# Patient Record
Sex: FEMALE | Race: WHITE | Employment: FULL TIME | URBAN - METROPOLITAN AREA
[De-identification: names, ages, dates, MRNs, and addresses within clinical notes are randomized per-mention and may not be internally consistent; named-entity substitution may affect disease eponyms.]

---

## 2023-02-27 DIAGNOSIS — M25.572 LEFT ANKLE PAIN, UNSPECIFIED CHRONICITY: Primary | ICD-10-CM

## 2023-02-28 ENCOUNTER — OFFICE VISIT (OUTPATIENT)
Dept: ORTHOPEDIC SURGERY | Age: 23
End: 2023-02-28

## 2023-02-28 VITALS — RESPIRATION RATE: 16 BRPM | OXYGEN SATURATION: 100 % | HEIGHT: 65 IN | WEIGHT: 158 LBS | BODY MASS INDEX: 26.33 KG/M2

## 2023-02-28 DIAGNOSIS — M62.81 MUSCLE WEAKNESS AFFECTING MOVEMENT OF FOOT: ICD-10-CM

## 2023-02-28 DIAGNOSIS — S86.012A RUPTURE OF LEFT ACHILLES TENDON, INITIAL ENCOUNTER: Primary | ICD-10-CM

## 2023-02-28 DIAGNOSIS — M25.572 LEFT ANKLE PAIN, UNSPECIFIED CHRONICITY: Primary | ICD-10-CM

## 2023-02-28 PROCEDURE — 99204 OFFICE O/P NEW MOD 45 MIN: CPT | Performed by: ORTHOPAEDIC SURGERY

## 2023-02-28 RX ORDER — ASPIRIN 325 MG
325 TABLET, DELAYED RELEASE (ENTERIC COATED) ORAL DAILY
Qty: 42 TABLET | Refills: 0 | Status: SHIPPED | OUTPATIENT
Start: 2023-02-28 | End: 2023-04-11

## 2023-02-28 NOTE — PROGRESS NOTES
[facility] PHYSICIANS [facility] ORTHOPEDICS AND SPORTS MEDICINE  [address]  [place] [address]  Dept: [phone]    Ambulatory Orthopedic Consult      CHIEF COMPLAINT:    Chief Complaint   Patient presents with    Ankle Pain     Left     Foot Pain     Left        HISTORY OF PRESENT ILLNESS:      The patient is a 22 y.o. female who is being seen for evaluation of the above, which began around 2/23/2023, atraumatically  . At today's visit, she is using a lace up ankle brace.     History is obtained today from:   [x]  the patient     [x]  EMR     []  one family member/friend    []  multiple family members/friends    []  other:      The patient is referred here today by Sena Martin of , and she has also seen an orthopedic surgeon in [place] about 2.5 years ago.  Concerning her onset of pain about 5 days ago, she denies feeling a pop, denies any specific injury, but reports that she had an insidious onset of increasing pain at the posterior aspect of the distal leg, with an associated sense of weakness.  She denies any other weakness in any other extremity and denies any other symptoms.    REVIEW OF SYSTEMS:  Musculoskeletal: See HPI for pertinent positives     Past Medical History:    She  has no past medical history on file.     Past Surgical History:    She  has a past surgical history that includes Tonsillectomy.     Current Medications:   No current outpatient medications on file.     Allergies:    Patient has no known allergies.    Family History:  family history includes Diabetes in her father.    Social History:   Social History     Occupational History    Not on file   Tobacco Use    Smoking status: Never    Smokeless tobacco: Not on file   Substance and Sexual Activity    Alcohol use: No    Drug use: No    Sexual activity: Not on file     Works full-time in     OBJECTIVE:  Resp 16   Ht 5' 5\" (1.651 m)   Wt 158 lb (71.7 kg)   SpO2 100%   BMI 26.29 kg/m²    Psych: awake, alert  Cardio:  well perfused extremities, no cyanosis  Resp:  normal respiratory effort  Musculoskeletal:    Affected lower extremity:    Vascular: Limb well perfused, compartments soft/compressible. Skin: No erythema/ulcers. Intact. Neurovascular Status:  Grossly neurovascularly intact throughout  Motion:  Grossly able to fire major muscle groups with appropriate/expected AROM, except for below  Tenderness to Palpation: Posterior Achilles tendon  -Achilles:    -Normal resting tone of affected side compared to contralateral  -Jazlyn Hacker test shows no loss of Achilles continuity  -Able to plantarflex through Achilles tendon, but significantly decreased at first, and with coaching she is eventually able to plantarflex with 4+/5 strength  -No palpable defect in the Achilles tendon, no nodules/mass palpated      RADIOLOGY:   2/28/2023 FINDINGS:  Three weightbearing views (AP, Mortise, and Lateral) of the left ankle and three weightbearing views (AP, Oblique, Lateral) of the left foot were obtained in the office today and reviewed, revealing no acute fracture, dislocation, or radioopaque foreign body/tumor. The ankle mortise is maintained with no widening of the clear spaces. Overall alignment is satisfactory. IMPRESSION:  No acute fracture/dislocation. Electronically signed by Kodak Milner MD     Relevant previous imaging reviewed, both imaging and report(s) as below:    No results found. ASSESSMENT AND PLAN:  Body mass index is 26.29 kg/m². She has left posterior distal leg pain with significant weakness, possibly secondary to an Achilles tendon injury concerning for possible tear, sustained around 2/23/2023.  -The differential diagnosis I am considering includes the following: Nerve lesion/disorder, muscle spasm. Notably, she has the past medical history as above.      We had a discussion today about the likely diagnosis and its natural history, physical exam and imaging findings, as well as various treatment options in detail. Surgically, we discussed that no surgical invention is indicated at this time, but I did recommend first obtaining a more definitive diagnosis with MRI as below. Orders/referrals were placed as below at today's visit. The patient was placed into an Ace wrap and a CAM boot, with 2 heel lifts in place. We discussed that the patient may weight-bear as tolerated, and may remove the boot at night to sleep. We also had a discussion about the risk of blood clot and thromboembolic events. The patient understands that there is an increased risk with surgery/immobilization, and understands nothing will completely eliminate the risk of DVT/PE's, and that any prophylactic medication does not substitute for early mobilization. Given her risk profile, I have recommended the following strategy to decrease the risk of blood clots, and the patient agrees and wishes to proceed:  Aspirin 325 mg PO q Day. In order to know exactly how to proceed with treatment, an MRI was ordered today to evaluate the Achilles tendon. This is medically necessary to evaluate the structures in this area, for both diagnosis and treatment. All questions were answered and the above plan was agreed upon. The patient will return to clinic after the MRI has been obtained. At her next visit, we will review her MRI, and possibly consider an EMG/NCS.             At the patient's next visit, depending on how the patient is doing and/or new imaging/labs results, we may consider the following options:    []  Lace up ankle     []  CAM boot         []  removable wrist brace     []  PT:        []  Wean out immobilization         []  Adv activity      []  Footmind        []  Spenco       []  Custom Orthotic:               []  AZ brace                    []  Rocker Bottom      []  Night splint    []  Heel cups        []  Strap        []  Toe gizmos    []  Topl []  NSAIDs         []  Aishwarya        []  Ref:         []  Stress Xray    []  CT        []  MRI  []  Inj:          []  Consider OR      []  Pick OR date    No follow-ups on file. No orders of the defined types were placed in this encounter. No orders of the defined types were placed in this encounter. Roxana He MD  Orthopedic Surgery        Please excuse any typos/errors, as this note was created with the assistance of voice recognition software. While intending to generate a document that actually reflects the content of the visit, the document can still have some errors including those of syntax and sound-a-like substitutions which may escape proof reading. In such instances, actual meaning can be extrapolated by context.

## 2023-03-10 ENCOUNTER — HOSPITAL ENCOUNTER (OUTPATIENT)
Dept: MRI IMAGING | Age: 23
Discharge: HOME | End: 2023-03-10
Payer: COMMERCIAL

## 2023-03-10 DIAGNOSIS — M62.81 MUSCLE WEAKNESS AFFECTING MOVEMENT OF FOOT: ICD-10-CM

## 2023-03-10 DIAGNOSIS — S86.012A RUPTURE OF LEFT ACHILLES TENDON, INITIAL ENCOUNTER: ICD-10-CM

## 2023-03-10 PROCEDURE — 73721 MRI JNT OF LWR EXTRE W/O DYE: CPT

## 2023-03-14 ENCOUNTER — OFFICE VISIT (OUTPATIENT)
Dept: ORTHOPEDIC SURGERY | Age: 23
End: 2023-03-14
Payer: COMMERCIAL

## 2023-03-14 VITALS — OXYGEN SATURATION: 100 % | BODY MASS INDEX: 26.33 KG/M2 | HEIGHT: 65 IN | RESPIRATION RATE: 16 BRPM | WEIGHT: 158 LBS

## 2023-03-14 DIAGNOSIS — M62.81 MUSCLE WEAKNESS AFFECTING MOVEMENT OF FOOT: ICD-10-CM

## 2023-03-14 DIAGNOSIS — S93.409D SPRAIN OF LIGAMENT OF ANKLE, SUBSEQUENT ENCOUNTER: Primary | ICD-10-CM

## 2023-03-14 DIAGNOSIS — M25.572 LEFT ANKLE PAIN, UNSPECIFIED CHRONICITY: ICD-10-CM

## 2023-03-14 PROCEDURE — 99214 OFFICE O/P EST MOD 30 MIN: CPT | Performed by: ORTHOPAEDIC SURGERY

## 2023-03-14 RX ORDER — NAPROXEN 500 MG/1
500 TABLET ORAL 2 TIMES DAILY PRN
Qty: 60 TABLET | Refills: 0 | Status: SHIPPED | OUTPATIENT
Start: 2023-03-14

## 2023-03-14 NOTE — PROGRESS NOTES
[address] [place] [address] AND SPORTS MEDICINE  [facility] Portillo  [address] [place] [address]  Dept: [phone]    Ambulatory Orthopedic Consult      CHIEF COMPLAINT:    Chief Complaint   Patient presents with    Ankle Pain     Left        HISTORY OF PRESENT ILLNESS:      The patient is a 23 y.o. female who is being seen for evaluation of the above, which began around 2/23/2023, atraumatically  . At today's visit, she is using a lace up ankle brace. History is obtained today from:   [x]  the patient     [x]  EMR     []  one family member/friend    []  multiple family members/friends    []  other:      The patient is referred here today by Jenise Ramos , and she has also seen an orthopedic surgeon in [place] about 2.5 years ago. Concerning her onset of pain about 5 days ago, she denies feeling a pop, denies any specific injury, but reports that she had an insidious onset of increasing pain at the posterior aspect of the distal leg, with an associated sense of weakness. She denies any other weakness in any other extremity and denies any other symptoms. INTERVAL HISTORY 3/14/2023:  She is seen again today in the office for follow up of imaging as below. Since being seen last, the patient is doing better in terms of pain and motion. At today's visit, she is using a brace/boot. History is obtained today from:   [x]  the patient     [x]  EMR     []  one family member/friend    []  multiple family members/friends    []  other: At today's visit, she localizes her pain to the anterolateral ankle, and lateral ankle/hindfoot diffusely. She reports that her posterior Achilles pain has improved. REVIEW OF SYSTEMS:  Musculoskeletal: See HPI for pertinent positives     Past Medical History:    She  has no past medical history on file. Past Surgical History:    She  has a past surgical history that includes Tonsillectomy.      Current Medications:     Current Outpatient Medications:     aspirin 325 MG EC tablet, Take 1 tablet by mouth daily, Disp: 42 tablet, Rfl: 0     Allergies:    Patient has no known allergies. Family History:  family history includes Diabetes in her father. Social History:   Social History     Occupational History    Not on file   Tobacco Use    Smoking status: Never    Smokeless tobacco: Not on file   Substance and Sexual Activity    Alcohol use: No    Drug use: No    Sexual activity: Not on file     Works full-time in [address]:  Resp 16   Ht 5' 5\" (1.651 m)   Wt 158 lb (71.7 kg)   SpO2 100%   BMI 26.29 kg/m²    Psych: awake, alert  Cardio:  well perfused extremities, no cyanosis  Resp:  normal respiratory effort  Musculoskeletal:    Affected lower extremity:    Vascular: Limb well perfused, compartments soft/compressible. Skin: No erythema/ulcers. Intact. Neurovascular Status:  Grossly neurovascularly intact throughout  Motion:  Grossly able to fire major muscle groups with appropriate/expected AROM, except for below  Tenderness to Palpation: Anterolateral ankle/lateral ankle/hindfoot diffusely, posterior Achilles tendon pain--improved  -Achilles:    -Normal resting tone of affected side compared to contralateral  -Tripathi test shows no loss of Achilles continuity  -Able to plantarflex through Achilles tendon, but significantly decreased at first, and with coaching she is eventually able to plantarflex with 4+/5 strength (on initial exam)--now significantly improved strength  -No palpable defect in the Achilles tendon, no nodules/mass palpated      -Instability:  Talar tilt: Negative, but painful; Anterior drawer: Limited exam secondary to pain  -No peroneal subluxation/dislocation with dorsiflexion + eversion or with circumduction      RADIOLOGY:   3/14/2023 Prior images reviewed for reference. MRI images and radiology report reviewed, as below:    1. Mild tendinosis of the intact Achilles tendon.   No retrocalcaneal bursitis. 2. Remote sprain of the intact anterior talofibular ligament. 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 3. No acute osseous findings. 4. Nonspecific subcutaneous edema. FINDINGS:  Three weightbearing views (AP, Mortise, and Lateral) of the left ankle and three weightbearing views (AP, Oblique, Lateral) of the left foot were obtained in the office today and reviewed, revealing no acute fracture, dislocation, or radioopaque foreign body/tumor. The ankle mortise is maintained with no widening of the clear spaces. Overall alignment is satisfactory. IMPRESSION:  No acute fracture/dislocation. Electronically signed by Nunu Soto MD     Relevant previous imaging reviewed, both imaging and report(s) as below:    No results found. ASSESSMENT AND PLAN:  Body mass index is 26.29 kg/m². She has left posterior distal leg pain with significant weakness, possibly secondary to an Achilles tendon injury concerning for possible tear, sustained around 2/23/2023. A left ankle MRI on 3/10/2023 showed an intact Achilles tendon with mild tendinosis, and remote sprain of an intact ATFL. -The differential diagnosis I am considering includes the following: Nerve lesion/disorder, muscle spasm/muscle shutdown secondary to injury rheumatological disorder, ankle sprain, tendinitis  -At today's visit, she does report pain now in her anterolateral ankle/lateral ankle/hindfoot, and does have improved strength and active range of motion. At today's visit, her symptoms seem more consistent with an ankle sprain. Notably, she has the past medical history as above. We had a discussion today about the likely diagnosis and its natural history, physical exam and imaging findings, as well as various treatment options in detail.     Surgically, we discussed that no surgical invention is indicated at this time, and I have recommended conservative management. Orders/referrals were placed as below at today's visit. The patient will stop using her cam boot, we discussed that she will discontinue her aspirin. The patient was provided a lace up brace, for stability and pain control, and to help decrease the risk of reinjury. The patient may weight bear as tolerated, and was cautioned to avoid pain provoking activity. The patient was referred to physical therapy. At today's visit, she was ordered oral NSAIDs as below to be used as needed, and we discussed the appropriate risks. The patient was provided teaching material on this today, and will avoid using multiple NSAIDs at the same time. All questions were answered and the above plan was agreed upon. The patient will return to clinic in 6 weeks without x-rays. At her next visit, depending how she is doing, we may consider a left ankle diagnostic/therapeutic injection and/or EMG/NCS. At the patient's next visit, depending on how the patient is doing and/or new imaging/labs results, we may consider the following options:    []  Lace up ankle     []  CAM boot         []  removable wrist brace     []  PT:        []  Wean out immobilization         []  Adv activity      []  Footmind        []  Spenco       []  Custom Orthotic:               []  AZ brace                    []  Rocker Bottom      []  Night splint    []  Heel cups        []  Strap        []  Toe gizmos    []  Topl        []  NSAIDs         []  Aishwarya        []  Ref:         []  Stress Xray    []  CT        []  MRI  []  Inj:          []  Consider OR      []  Pick OR date    No follow-ups on file. No orders of the defined types were placed in this encounter. No orders of the defined types were placed in this encounter. Sangita Garcia MD  Orthopedic Surgery        Please excuse any typos/errors, as this note was created with the assistance of voice recognition software.  While intending to generate a document that actually reflects the content of the visit, the document can still have some errors including those of syntax and sound-a-like substitutions which may escape proof reading. In such instances, actual meaning can be extrapolated by context.

## 2023-03-20 ENCOUNTER — HOSPITAL ENCOUNTER (OUTPATIENT)
Dept: PHYSICAL THERAPY | Facility: CLINIC | Age: 23
Discharge: HOME | End: 2023-03-20

## 2023-03-20 PROCEDURE — 97016 VASOPNEUMATIC DEVICE THERAPY: CPT

## 2023-03-20 PROCEDURE — 97161 PT EVAL LOW COMPLEX 20 MIN: CPT

## 2023-03-20 PROCEDURE — 97110 THERAPEUTIC EXERCISES: CPT

## 2023-03-20 NOTE — CONSULTS
EPIC         Tests: [] X-Ray: [x] MRI:   Impression   1. Mild tendinosis of the intact Achilles tendon. No retrocalcaneal bursitis. 2. Remote sprain of the intact anterior talofibular ligament. 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 3. No acute osseous findings. 4. Nonspecific subcutaneous edema. [] Other:    Medications: [x] Refer to full medical record [] None [] Other:  Allergies:      [x] Refer to full medical record [] None [] Other:        Marital Status    Home type    Stairs from outside            Hand rail    Stairs inside            [address] [place] [address]-     Job status    Work Activities/duties     Recreational Activities Biking, walking, hiking        Pain present? Yes   Location L ankle globally   Pain Rating currently 4/10   Pain at worse 9/10   Pain at best 2/10   Description of pain Uncomfortable, ache    Altered Sensation Tingling at times in dorsum of foot    What makes it worse Unsure    What makes it better Unsure, ice at night    Symptom progression Not changing    Sleep Difficult to get comfortable. Objective:    ROM  ° A/P STRENGTH    Left Right Left Right   Ext       Ankle DF knee ext -5 10 3-/5 5/5   Ankle DF knee flex  0 25     PF 40 55 3-/5 4+/5   INV 35 35 3/5 4+/5   EVER 10 12 3-/5 4+/5              OBSERVATION No Deficit Deficit Not Tested Comments   Posture       Forward Head [] [] []    Rounded Shoulders [] [] []    Observation        Pes planus bilaterally. Pt does have Dr. Tino Lazar arch support insert in R shoe.  May benefit from OTC arch support once ankle pain has decreased    Palpation [] [] []    Sensation [] [] []    Edema [] [] []    Neurological [] [] []    Patellar Mobility [] [] []    Patellar Orientation [] [] []    Gait [] [x] [] Analysis: Foot flat on push off, unable to perform active PF during gait         FUNCTION Normal Difficult Unable   Sitting [x] [] []   Standing [] [x] []   Ambulation [] [x] []

## 2023-03-29 ENCOUNTER — HOSPITAL ENCOUNTER (OUTPATIENT)
Dept: PHYSICAL THERAPY | Facility: CLINIC | Age: 23
Setting detail: THERAPIES SERIES
Discharge: HOME | End: 2023-03-29

## 2023-03-29 PROCEDURE — 9900000067 HC THERAPEUTIC EXERCISE EA 15 MINS (SELF-PAY)

## 2023-03-29 NOTE — FLOWSHEET NOTE
[] [address] - . [facility] &  Therapy  [address]  [phone]  F: [phone] [] [address]  [address]   [address]  P: [phone]  F: [phone] [] [address]  [address]  P: [phone]  F: [phone] [x] [address]  P: [phone]  F: [phone] [] [address]  [facility]   [address]   [place]: [phone]  F: [phone]      Physical Therapy Daily Treatment Note    Date:  3/29/2023  Patient Name:  Rhiannon Livingston    :  2000  MRN: [id number]  Physician: Dr. Viera Pagoda: Piter Johnson Diagnosis: L ankle sprain               Rehab Codes: M25. 572 (L ankle pain)  Onset date: 23               Next Dr's appt.: TBA pending PT  Visit# / total visits: 2/12   Cancels/No Shows: 0/0    Subjective:    Pain:  [] Yes  [] No Location: L ankle  Pain Rating: (0-10 scale) 2/10  Pain altered Tx:  [] No  [] Yes  Action:  Comments: Pt arrives with signifncaly improved pain. Staets to being able to walk without pain. Only slight soreness today d/t walking a lot at work today. Is still wearing brace when outside of the home, is able to ambulate without brace at home.      Objective:  Modalities:   Precautions:  Exercises:  Exercise Reps/ Time Weight/ Level Comments         Ankle tband 4 way 20x orange Able to perform without pain   SLS  3x30\"                                                                                                           Other: Manual: Milking massage to LLE      Treatment Charges: Mins Units   []  Modalities     [x]  Ther Exercise 20 1   [x]  Manual Therapy 5 0   []  Ther Activities     []  Aquatics     []  Vasocompression     []  Other     Total

## 2023-04-12 PROBLEM — M19.079 ANKLE INFLAMMATION: Status: ACTIVE | Noted: 2023-02-16

## 2023-04-12 PROBLEM — N94.6 DYSMENORRHEA: Status: ACTIVE | Noted: 2023-04-12

## 2023-04-21 ENCOUNTER — OFFICE VISIT (OUTPATIENT)
Dept: FAMILY MEDICINE CLINIC | Age: 23
End: 2023-04-21
Payer: COMMERCIAL

## 2023-04-21 VITALS
SYSTOLIC BLOOD PRESSURE: 113 MMHG | HEIGHT: 66 IN | BODY MASS INDEX: 26.45 KG/M2 | OXYGEN SATURATION: 99 % | DIASTOLIC BLOOD PRESSURE: 72 MMHG | WEIGHT: 164.6 LBS | HEART RATE: 72 BPM

## 2023-04-21 DIAGNOSIS — R22.1 MASS OF LEFT SIDE OF NECK: Primary | ICD-10-CM

## 2023-04-21 DIAGNOSIS — L98.9 FACIAL LESION: ICD-10-CM

## 2023-04-21 PROCEDURE — 99213 OFFICE O/P EST LOW 20 MIN: CPT | Performed by: NURSE PRACTITIONER

## 2023-04-21 ASSESSMENT — PATIENT HEALTH QUESTIONNAIRE - PHQ9
2. FEELING DOWN, DEPRESSED OR HOPELESS: 0
SUM OF ALL RESPONSES TO PHQ QUESTIONS 1-9: 0
SUM OF ALL RESPONSES TO PHQ QUESTIONS 1-9: 0
1. LITTLE INTEREST OR PLEASURE IN DOING THINGS: 0
DEPRESSION UNABLE TO ASSESS: FUNCTIONAL CAPACITY MOTIVATION LIMITS ACCURACY
SUM OF ALL RESPONSES TO PHQ9 QUESTIONS 1 & 2: 0
SUM OF ALL RESPONSES TO PHQ QUESTIONS 1-9: 0
SUM OF ALL RESPONSES TO PHQ QUESTIONS 1-9: 0

## 2023-04-21 ASSESSMENT — ENCOUNTER SYMPTOMS
ABDOMINAL PAIN: 0
CONSTIPATION: 0
SINUS PRESSURE: 0
DIARRHEA: 0
CHEST TIGHTNESS: 0
SHORTNESS OF BREATH: 0
COLOR CHANGE: 0

## 2023-06-08 ENCOUNTER — PROCEDURE VISIT (OUTPATIENT)
Dept: OBGYN CLINIC | Age: 23
End: 2023-06-08

## 2023-06-08 VITALS
HEART RATE: 98 BPM | WEIGHT: 164 LBS | DIASTOLIC BLOOD PRESSURE: 73 MMHG | SYSTOLIC BLOOD PRESSURE: 108 MMHG | BODY MASS INDEX: 26.36 KG/M2 | HEIGHT: 66 IN

## 2023-06-08 DIAGNOSIS — N94.6 DYSMENORRHEA: ICD-10-CM

## 2023-06-08 DIAGNOSIS — N92.6 ABNORMAL MENSTRUAL PERIODS: ICD-10-CM

## 2023-06-08 DIAGNOSIS — Z30.430 ENCOUNTER FOR IUD INSERTION: Primary | ICD-10-CM

## 2023-06-08 DIAGNOSIS — Z23 NEED FOR HPV VACCINATION: ICD-10-CM

## 2023-06-08 DIAGNOSIS — N86 ECTROPION, CERVIX: ICD-10-CM

## 2023-06-08 LAB
CONTROL: PRESENT
PREGNANCY TEST URINE, POC: NEGATIVE

## 2023-06-08 NOTE — PROGRESS NOTES
After obtaining consent, and per orders of Dr. Rosy Galarza, injection of Gardasil given in Left deltoid by Dionne Herr. Patient instructed to remain in clinic for 20 minutes afterwards, and to report any adverse reaction to me immediately.

## 2023-06-08 NOTE — PROGRESS NOTES
[address] [facility]  [facility] Callie Gomez  [address]  [address]  Dept: [phone]      OB/GYN   Procedure Note    Guerra Ella  6/8/2023                       Primary Care Physician: CHOCO Rosado CNP      Subjective:   Guerra Ella 20 y.o. female Dayron Sun is here for previously scheduled Mirena  IUD due to history of dysmenorrhea. Patient's last menstrual period was 05/31/2023. Romeo Patella She is currently taking OCP and denies any recent unprotected intercourse. Urine pregnancy test negative today. She has no complaints today. She is known to have painful menses that interfere with her ADL's. She has tried NSAIDS in the past with little success. She wishes to continue to utilize barrier contraception for family planning and STD precautions. The side effect profile of the IUD was reviewed. All other forms of family planning and options for treatment of her dysmenorrhea were reviewed with the patient. The patient denies use of tobacco products. The patient denies any family member or herself as having a blood clot in their leg, lung, brain or that any member of her family has had a sudden cardiac death under the age of 37 yo. Vitals:   Vitals:    06/08/23 1512   BP: 108/73   Site: Right Upper Arm   Position: Sitting   Cuff Size: Large Adult   Pulse: 98   Weight: 164 lb (74.4 kg)   Height: 5' 6\" (1.676 m)        Cultures:  Vaginal cultures were negative on 4/12/23    Procedure: Insertion of Mirena  IUD    Indications: Dysmenorrhea      Mirena IUD Insertion   The patient was counseled on the procedure. Risks, benefits and alternatives were reviewed. The patient is aware that this is diagnostic and not curative and a second procedure may be needed. A consent was reviewed and obtained. Chaperone was present for insertion. A sterile speculum was placed without incident. The cervix was then cleansed with betadine.  An [employer]

## 2023-07-20 ENCOUNTER — PROCEDURE VISIT (OUTPATIENT)
Dept: OBGYN CLINIC | Age: 23
End: 2023-07-20

## 2023-07-20 VITALS
HEART RATE: 75 BPM | SYSTOLIC BLOOD PRESSURE: 110 MMHG | DIASTOLIC BLOOD PRESSURE: 71 MMHG | WEIGHT: 172 LBS | HEIGHT: 66 IN | BODY MASS INDEX: 27.64 KG/M2

## 2023-07-20 DIAGNOSIS — Z30.431 IUD CHECK UP: Primary | ICD-10-CM

## 2023-07-20 NOTE — PROGRESS NOTES
Chaperone for Intimate Exam  Chaperone was offered as part of the rooming process. Patient declined and agrees to continue with exam without a chaperone.
bruising  Immunologic/Lymphatic: negative recent illness, negative recent sick contact  Musculoskeletal: negative back pain, negative myalgias   Neurological:  negative dizziness, negative migraines, negative seizures   Behavior/Psych: negative depression, negative anxiety     VITALS:  Vitals:    07/20/23 0949   BP: 110/71   Site: Right Upper Arm   Position: Sitting   Cuff Size: Medium Adult   Pulse: 75   Weight: 172 lb (78 kg)   Height: 5' 6\" (1.676 m)       PHYSICAL EXAM:  General appearance: no apparent distress, alert and cooperative  HEENT: head atraumatic, normocephalic, trachea midline, moist mucous membranes   Neurologic: alert, oriented, normal speech, no focal findings or movement disorder noted  Lungs: no increased work of breathing   Abdomen: soft, non-tender on palpation,  no guarding, no rebound, no rigidity   Extremities:  no calf tenderness bilaterally, non-edematous bilaterally   Musculoskeletal: no gross abnormalities, range of motion appropriate for age   Psychiatric: mood appropriate, normal affect   Pelvic Exam:chaperone declined   Vulva: normal appearing vulva, no masses, tenderness or lesions, normal clitoris    Vagina: normal appearing urethral meatus, normal appearing vaginal mucosa with normal color and discharge, no lesions, no vaginal bleeding     Cervix: ectropion cervix, IUD strings seen at os, some yellow tinged discharge present on strings that was easily wiped away with swab, no CMT     Assessment/Plan:  Mirena IUD String Check    - VSS   - Strings seen at os    - Patient has no complaints since IUD was placed    - Discussed menstrual cup use    - Return to the office April 2024 for well women examination      Diagnosis Orders   1.  IUD check up          Patient Active Problem List    Diagnosis Date Noted    Ectropion, cervix 06/08/2023    Dysmenorrhea 04/12/2023     Mirena IUD inserted 6/8/23        Ankle inflammation 02/16/2023         Counseling:  Repeat Annual every 1

## 2023-10-11 ENCOUNTER — OFFICE VISIT (OUTPATIENT)
Dept: OBGYN CLINIC | Age: 23
End: 2023-10-11
Payer: COMMERCIAL

## 2023-10-11 DIAGNOSIS — Z23 NEED FOR HPV VACCINATION: Primary | ICD-10-CM

## 2023-10-11 PROCEDURE — 90471 IMMUNIZATION ADMIN: CPT | Performed by: OBSTETRICS & GYNECOLOGY

## 2023-10-11 PROCEDURE — 99999 PR OFFICE/OUTPT VISIT,PROCEDURE ONLY: CPT | Performed by: OBSTETRICS & GYNECOLOGY

## 2023-10-11 PROCEDURE — 90651 9VHPV VACCINE 2/3 DOSE IM: CPT | Performed by: OBSTETRICS & GYNECOLOGY

## 2023-10-11 NOTE — PROGRESS NOTES
After obtaining verbal consent, and per orders of Dr. Joe Rosado, injection of HPV 0.5mL given in Left deltoid IM by Niall Patten. Patient instructed to remain in clinic for 20 minutes afterwards, and to report any adverse reaction to me immediately.

## 2024-05-09 ENCOUNTER — HOSPITAL ENCOUNTER (OUTPATIENT)
Age: 24
Setting detail: SPECIMEN
Discharge: HOME | End: 2024-05-09

## 2024-05-09 ENCOUNTER — OFFICE VISIT (OUTPATIENT)
Dept: OBGYN CLINIC | Age: 24
End: 2024-05-09
Payer: COMMERCIAL

## 2024-05-09 VITALS
WEIGHT: 163.8 LBS | BODY MASS INDEX: 26.44 KG/M2 | HEART RATE: 79 BPM | DIASTOLIC BLOOD PRESSURE: 80 MMHG | SYSTOLIC BLOOD PRESSURE: 122 MMHG

## 2024-05-09 DIAGNOSIS — Z01.419 WELL WOMAN EXAM WITH ROUTINE GYNECOLOGICAL EXAM: Primary | ICD-10-CM

## 2024-05-09 DIAGNOSIS — N89.8 VAGINAL DISCHARGE: ICD-10-CM

## 2024-05-09 DIAGNOSIS — N88.8 FRIABLE CERVIX: ICD-10-CM

## 2024-05-09 DIAGNOSIS — Z01.419 WELL WOMAN EXAM WITH ROUTINE GYNECOLOGICAL EXAM: ICD-10-CM

## 2024-05-09 PROCEDURE — 99395 PREV VISIT EST AGE 18-39: CPT | Performed by: STUDENT IN AN ORGANIZED HEALTH CARE EDUCATION/TRAINING PROGRAM

## 2024-05-09 NOTE — PROGRESS NOTES
palpable masses   Rectal Exam: external exam unremarkable   Breast: no tenderness on palpation, no masses appreciated, no nipple retraction, dimpling, discharge or bleeding, no axillary or supraclavicular adenopathy, self breast exams were reviewed in detail     ASSESSMENT/PLAN:  Sandra Dawson is a 23 y.o. female  here for her annual exam      Well Women Gynecological Exam   - VSS      - Pap smear not due this yr   - Vaginitis and GC/C collected due to friable appearing edge of cervix. Will call patient with any abnormal results      - STD counseling and prevention reviewed. Patient declined blood work for T.Pal, Hepatitis and HIV testing    - Birth control and barrier recommendations discussed  - Mirena IUD in place, strings seen today, she is happy with her implant    - Gardasil: done    - S/p COVID19 vaccine x 1   - Mammograms every year if the patient is 40 years old and her last mammogram was negative   - Calcium and Vitamin D dosing reviewed    - Colonoscopy screening reviewed as well as onset for bone density testing   - Routine health maintenance per patient's PCP   - Repeat annual exam every year   Return in about 1 year (around 2025) for Well women examination.    Counseling Completed:  Hereditary breast, ovarian, colon and uterine cancer screening done  Tobacco and secondary smoke risks reviewed. Instructed on cessation and avoidance     Orders Placed This Encounter   Procedures    Vaginitis DNA Probe     Standing Status:   Future     Standing Expiration Date:   2025    Chlamydia Trachomatis & Neisseria gonorrhoeae (GC) by amplified detection     Standing Status:   Future     Standing Expiration Date:   2025        Patient Active Problem List    Diagnosis Date Noted    Ectropion, cervix 2023    Dysmenorrhea 2023     Mirena IUD inserted 23      Ankle inflammation 2023        DO Lalita Mendez [facility]  2024, 11:00 AM

## 2024-05-10 LAB
C TRACH DNA SPEC QL PROBE+SIG AMP: NEGATIVE
CANDIDA SPECIES: NEGATIVE
GARDNERELLA VAGINALIS: NEGATIVE
N GONORRHOEA DNA SPEC QL PROBE+SIG AMP: NEGATIVE
SOURCE: NORMAL
SPECIMEN DESCRIPTION: NORMAL
TRICHOMONAS: NEGATIVE

## 2025-02-22 ASSESSMENT — PATIENT HEALTH QUESTIONNAIRE - PHQ9
SUM OF ALL RESPONSES TO PHQ QUESTIONS 1-9: 1
SUM OF ALL RESPONSES TO PHQ9 QUESTIONS 1 & 2: 1
2. FEELING DOWN, DEPRESSED OR HOPELESS: SEVERAL DAYS
SUM OF ALL RESPONSES TO PHQ QUESTIONS 1-9: 1
2. FEELING DOWN, DEPRESSED OR HOPELESS: SEVERAL DAYS
SUM OF ALL RESPONSES TO PHQ QUESTIONS 1-9: 1
1. LITTLE INTEREST OR PLEASURE IN DOING THINGS: NOT AT ALL
1. LITTLE INTEREST OR PLEASURE IN DOING THINGS: NOT AT ALL
SUM OF ALL RESPONSES TO PHQ QUESTIONS 1-9: 1
SUM OF ALL RESPONSES TO PHQ9 QUESTIONS 1 & 2: 1

## 2025-02-23 NOTE — PROGRESS NOTES
neck supple.   Skin:     General: Skin is warm and dry.      Capillary Refill: Capillary refill takes less than 2 seconds.   Neurological:      Mental Status: She is alert and oriented to person, place, and time.      Motor: No weakness.      Gait: Gait normal.   Psychiatric:         Mood and Affect: Mood normal.         Behavior: Behavior normal.         Thought Content: Thought content normal.         Judgment: Judgment normal.           2/25/2025     8:40 AM   PAULIE-7 SCREENING   Feeling nervous, anxious, or on edge Several days   Not being able to stop or control worrying More than half the days   Worrying too much about different things Several days   Trouble relaxing Nearly every day   Being so restless that it is hard to sit still Nearly every day   Becoming easily annoyed or irritable More than half the days   Feeling afraid as if something awful might happen More than half the days   PAULIE-7 Total Score 14   How difficult have these problems made it for you to do your work, take care of things at home, or get along with other people? Somewhat difficult         Diagnoses / Plan:   1. Well adult exam  2. Paresthesias in left hand  -     XR CERVICAL SPINE (2-3 VIEWS); Future  -     CBC; Future  -     Iron and TIBC; Future  -     Ferritin; Future  -     TSH reflex to FT4; Future  3. Postcoital bleeding  -     CBC; Future  -     Iron and TIBC; Future  -     Ferritin; Future  4. Nevus of face  -     AFL - Rasheeda Richards, CNP, Dermatology, [place]  5. Anxiety  -     hydrOXYzine HCl (ATARAX) 25 MG tablet; Take 1 tablet by mouth 2 times daily as needed for Itching, Disp-60 tablet, R-0Normal  -     CBC; Future  -     Comprehensive Metabolic Panel; Future  -     Iron and TIBC; Future  -     Ferritin; Future  -     TSH reflex to FT4; Future         Encouraged efforts at healthy lifestyle with nutritious diet and regular physical activity.   Understanding and agreement was voiced with all above plans. All questions

## 2025-02-25 ENCOUNTER — HOSPITAL ENCOUNTER (OUTPATIENT)
Age: 25
Setting detail: SPECIMEN
Discharge: HOME | End: 2025-02-25

## 2025-02-25 ENCOUNTER — OFFICE VISIT (OUTPATIENT)
Dept: FAMILY MEDICINE CLINIC | Age: 25
End: 2025-02-25

## 2025-02-25 VITALS
HEART RATE: 59 BPM | HEIGHT: 66 IN | BODY MASS INDEX: 26.03 KG/M2 | DIASTOLIC BLOOD PRESSURE: 73 MMHG | WEIGHT: 162 LBS | SYSTOLIC BLOOD PRESSURE: 111 MMHG

## 2025-02-25 DIAGNOSIS — R20.2 PARESTHESIAS IN LEFT HAND: ICD-10-CM

## 2025-02-25 DIAGNOSIS — Z00.00 WELL ADULT EXAM: Primary | ICD-10-CM

## 2025-02-25 DIAGNOSIS — F41.9 ANXIETY: ICD-10-CM

## 2025-02-25 DIAGNOSIS — N93.0 POSTCOITAL BLEEDING: ICD-10-CM

## 2025-02-25 DIAGNOSIS — D22.30 NEVUS OF FACE: ICD-10-CM

## 2025-02-25 LAB
ALBUMIN SERPL-MCNC: 4.7 G/DL (ref 3.5–5.2)
ALBUMIN/GLOB SERPL: 1.7 {RATIO} (ref 1–2.5)
ALP SERPL-CCNC: 64 U/L (ref 35–104)
ALT SERPL-CCNC: 13 U/L (ref 10–35)
ANION GAP SERPL CALCULATED.3IONS-SCNC: 10 MMOL/L (ref 9–16)
AST SERPL-CCNC: 18 U/L (ref 10–35)
BILIRUB SERPL-MCNC: 0.7 MG/DL (ref 0–1.2)
BUN SERPL-MCNC: 11 MG/DL (ref 6–20)
CALCIUM SERPL-MCNC: 9.8 MG/DL (ref 8.6–10.4)
CHLORIDE SERPL-SCNC: 105 MMOL/L (ref 98–107)
CO2 SERPL-SCNC: 27 MMOL/L (ref 20–31)
CREAT SERPL-MCNC: 0.9 MG/DL (ref 0.6–0.9)
ERYTHROCYTE [DISTWIDTH] IN BLOOD BY AUTOMATED COUNT: 11.7 % (ref 11.8–14.4)
FERRITIN SERPL-MCNC: 129 NG/ML (ref 15–150)
GFR, ESTIMATED: >90 ML/MIN/1.73M2
GLUCOSE SERPL-MCNC: 81 MG/DL (ref 74–99)
HCT VFR BLD AUTO: 42.5 % (ref 36.3–47.1)
HGB BLD-MCNC: 15 G/DL (ref 11.9–15.1)
IRON SATN MFR SERPL: 43 % (ref 20–55)
IRON SERPL-MCNC: 124 UG/DL (ref 37–145)
MCH RBC QN AUTO: 31.5 PG (ref 25.2–33.5)
MCHC RBC AUTO-ENTMCNC: 35.3 G/DL (ref 28.4–34.8)
MCV RBC AUTO: 89.3 FL (ref 82.6–102.9)
NRBC BLD-RTO: 0 PER 100 WBC
PLATELET # BLD AUTO: 295 K/UL (ref 138–453)
PMV BLD AUTO: 10.5 FL (ref 8.1–13.5)
POTASSIUM SERPL-SCNC: 4.3 MMOL/L (ref 3.7–5.3)
PROT SERPL-MCNC: 7.4 G/DL (ref 6.6–8.7)
RBC # BLD AUTO: 4.76 M/UL (ref 3.95–5.11)
SODIUM SERPL-SCNC: 142 MMOL/L (ref 136–145)
TIBC SERPL-MCNC: 290 UG/DL (ref 250–450)
TSH SERPL DL<=0.05 MIU/L-ACNC: 2.28 UIU/ML (ref 0.27–4.2)
UNSATURATED IRON BINDING CAPACITY: 166 UG/DL (ref 112–347)
WBC OTHER # BLD: 5.8 K/UL (ref 3.5–11.3)

## 2025-02-25 RX ORDER — M-VIT,TX,IRON,MINS/CALC/FOLIC 27MG-0.4MG
1 TABLET ORAL DAILY
COMMUNITY

## 2025-02-25 RX ORDER — HYDROXYZINE HYDROCHLORIDE 25 MG/1
25 TABLET, FILM COATED ORAL 2 TIMES DAILY PRN
Qty: 60 TABLET | Refills: 0 | Status: SHIPPED | OUTPATIENT
Start: 2025-02-25 | End: 2025-03-27

## 2025-02-25 SDOH — ECONOMIC STABILITY: FOOD INSECURITY: WITHIN THE PAST 12 MONTHS, THE FOOD YOU BOUGHT JUST DIDN'T LAST AND YOU DIDN'T HAVE MONEY TO GET MORE.: NEVER TRUE

## 2025-02-25 SDOH — ECONOMIC STABILITY: FOOD INSECURITY: WITHIN THE PAST 12 MONTHS, YOU WORRIED THAT YOUR FOOD WOULD RUN OUT BEFORE YOU GOT MONEY TO BUY MORE.: NEVER TRUE

## 2025-02-25 ASSESSMENT — ANXIETY QUESTIONNAIRES
5. BEING SO RESTLESS THAT IT IS HARD TO SIT STILL: NEARLY EVERY DAY
7. FEELING AFRAID AS IF SOMETHING AWFUL MIGHT HAPPEN: MORE THAN HALF THE DAYS
2. NOT BEING ABLE TO STOP OR CONTROL WORRYING: MORE THAN HALF THE DAYS
6. BECOMING EASILY ANNOYED OR IRRITABLE: MORE THAN HALF THE DAYS
4. TROUBLE RELAXING: NEARLY EVERY DAY
GAD7 TOTAL SCORE: 14
3. WORRYING TOO MUCH ABOUT DIFFERENT THINGS: SEVERAL DAYS
IF YOU CHECKED OFF ANY PROBLEMS ON THIS QUESTIONNAIRE, HOW DIFFICULT HAVE THESE PROBLEMS MADE IT FOR YOU TO DO YOUR WORK, TAKE CARE OF THINGS AT HOME, OR GET ALONG WITH OTHER PEOPLE: SOMEWHAT DIFFICULT
1. FEELING NERVOUS, ANXIOUS, OR ON EDGE: SEVERAL DAYS

## 2025-02-27 DIAGNOSIS — R20.2 PARESTHESIAS IN LEFT HAND: ICD-10-CM
